# Patient Record
Sex: FEMALE | Race: WHITE | NOT HISPANIC OR LATINO | URBAN - METROPOLITAN AREA
[De-identification: names, ages, dates, MRNs, and addresses within clinical notes are randomized per-mention and may not be internally consistent; named-entity substitution may affect disease eponyms.]

---

## 2020-05-19 ENCOUNTER — TELEHEALTH PROVIDED OTHER THAN IN PATIENT'S HOME (OUTPATIENT)
Dept: URBAN - METROPOLITAN AREA TELEHEALTH 3 | Facility: TELEHEALTH | Age: 35
End: 2020-05-19

## 2020-05-19 VITALS — HEIGHT: 67 IN | WEIGHT: 180 LBS

## 2020-05-19 DIAGNOSIS — R19.7 DIARRHEA, UNSPECIFIED: ICD-10-CM

## 2020-05-19 DIAGNOSIS — R11.2 NAUSEA WITH VOMITING, UNSPECIFIED: ICD-10-CM

## 2020-05-19 DIAGNOSIS — R51 HEADACHE: ICD-10-CM

## 2020-05-19 PROCEDURE — 99244 OFF/OP CNSLTJ NEW/EST MOD 40: CPT | Mod: 95 | Performed by: PHYSICIAN ASSISTANT

## 2020-05-19 RX ORDER — ONDANSETRON 4 MG/1
TABLET, ORALLY DISINTEGRATING ORAL
Qty: 30 | Refills: 0 | Status: COMPLETED
Start: 2020-05-19 | End: 2024-06-05

## 2020-05-19 NOTE — SERVICEHPINOTES
PATIENT VERIFIED BY DATE OF BIRTH AND NAME. Patient has been consented for this telecommunication visit.     DELIA PASCUAL   is a   34   year old    female who is being seen in consultation at the request of   SIN CLAUDIO   for digestive symptoms over the past few weeks. She notes that she has been getting headaches in the afternoons and then when she tries to eat her evening meal, she vomits (within 5-10 minutes). All the food comes back up. She will still feel hungry and has been able to keep some bland foods down. No abdominal pain. She feels a sense of fogginess and weakness after vomiting. Bowel habits are somewhat irregular - chronically. She has occasional diarrhea - may have had more diarrhea the first weak of her symptoms. No blood in stools. No NSAID use. No fevers. Has lost a little weight due to the vomiting issues. Uses Tums for heartburn occasionally. She reports a h/o GI issues circa 2013 and also notes intermittent issues over the years. She reports h/o peptic ulcer (had an EGD) in high school. For 4-5 years after high school, she would have diarrhea and rectal bleeding. She reports 3 colonoscopies during that time (also drank alcohol heavily during that time) with benign findings. She had colon polyps removed (was told none were precancerous and that 10-year f/u would be fine). She reports a h/o bulimia but this hasn't been an issue in the past 6 years.  She denies possibility of pregnancy but is ok with me including that in her lab order today.

## 2020-05-19 NOTE — INTERFACERESULTNOTES
Labs show a very mild liver enzyme elevation but are otherwise normal - no pre-diabetes, normal inflammatory markers, acceptable iron levels, normal TSH thyroid test. Would schedule a RUQ U/S given elevated liver enzymes. F/U visit after EGD and U/S are both done.

## 2020-05-22 LAB
C-REACTIVE PROTEIN: 1.4 MG/L (ref ?–8)
CBC (INCLUDES DIFF/PLT): ABSOLUTE BAND NEUTROPHILS: NORMAL CELLS/UL
CBC (INCLUDES DIFF/PLT): ABSOLUTE BASOPHILS: 30 CELLS/UL (ref 0–200)
CBC (INCLUDES DIFF/PLT): ABSOLUTE BLASTS: NORMAL CELLS/UL
CBC (INCLUDES DIFF/PLT): ABSOLUTE EOSINOPHILS: 81 CELLS/UL (ref 15–500)
CBC (INCLUDES DIFF/PLT): ABSOLUTE LYMPHOCYTES: 1946 CELLS/UL (ref 850–3900)
CBC (INCLUDES DIFF/PLT): ABSOLUTE METAMYELOCYTES: NORMAL CELLS/UL
CBC (INCLUDES DIFF/PLT): ABSOLUTE MONOCYTES: 488 CELLS/UL (ref 200–950)
CBC (INCLUDES DIFF/PLT): ABSOLUTE MYELOCYTES: NORMAL CELLS/UL
CBC (INCLUDES DIFF/PLT): ABSOLUTE NEUTROPHILS: 4854 CELLS/UL (ref 1500–7800)
CBC (INCLUDES DIFF/PLT): ABSOLUTE NUCLEATED RBC: NORMAL CELLS/UL
CBC (INCLUDES DIFF/PLT): ABSOLUTE PROMYELOCYTES: NORMAL CELLS/UL
CBC (INCLUDES DIFF/PLT): BAND NEUTROPHILS: NORMAL %
CBC (INCLUDES DIFF/PLT): BASOPHILS: 0.4 %
CBC (INCLUDES DIFF/PLT): BLASTS: NORMAL %
CBC (INCLUDES DIFF/PLT): COMMENT(S): NORMAL
CBC (INCLUDES DIFF/PLT): EOSINOPHILS: 1.1 %
CBC (INCLUDES DIFF/PLT): HEMATOCRIT: 40.8 % (ref 35–45)
CBC (INCLUDES DIFF/PLT): HEMOGLOBIN: 14.2 G/DL (ref 11.7–15.5)
CBC (INCLUDES DIFF/PLT): LYMPHOCYTES: 26.3 %
CBC (INCLUDES DIFF/PLT): MCH: 31.6 PG (ref 27–33)
CBC (INCLUDES DIFF/PLT): MCHC: 34.8 G/DL (ref 32–36)
CBC (INCLUDES DIFF/PLT): MCV: 90.7 FL (ref 80–100)
CBC (INCLUDES DIFF/PLT): METAMYELOCYTES: NORMAL %
CBC (INCLUDES DIFF/PLT): MONOCYTES: 6.6 %
CBC (INCLUDES DIFF/PLT): MPV: 10.4 FL (ref 7.5–12.5)
CBC (INCLUDES DIFF/PLT): MYELOCYTES: NORMAL %
CBC (INCLUDES DIFF/PLT): NEUTROPHILS: 65.6 %
CBC (INCLUDES DIFF/PLT): NUCLEATED RBC: NORMAL /100 WBC
CBC (INCLUDES DIFF/PLT): PLATELET COUNT: 292 THOUSAND/UL (ref 140–400)
CBC (INCLUDES DIFF/PLT): PROMYELOCYTES: NORMAL %
CBC (INCLUDES DIFF/PLT): RDW: 11.8 % (ref 11–15)
CBC (INCLUDES DIFF/PLT): REACTIVE LYMPHOCYTES: NORMAL %
CBC (INCLUDES DIFF/PLT): RED BLOOD CELL COUNT: 4.5 MILLION/UL (ref 3.8–5.1)
CBC (INCLUDES DIFF/PLT): WHITE BLOOD CELL COUNT: 7.4 THOUSAND/UL (ref 3.8–10.8)
CELIAC DISEASE COMPREHENSIVE PANEL: IMMUNOGLOBULIN A: 159 MG/DL (ref 47–310)
CELIAC DISEASE COMPREHENSIVE PANEL: INTERPRETATION: (no result)
CELIAC DISEASE COMPREHENSIVE PANEL: TISSUE TRANSGLUTAMINASE AB, IGA: 1 U/ML
COMPREHENSIVE METABOLIC PANEL: ALBUMIN/GLOBULIN RATIO: 2.1 (CALC) (ref 1–2.5)
COMPREHENSIVE METABOLIC PANEL: ALBUMIN: 4.8 G/DL (ref 3.6–5.1)
COMPREHENSIVE METABOLIC PANEL: ALKALINE PHOSPHATASE: 89 U/L (ref 31–125)
COMPREHENSIVE METABOLIC PANEL: ALT: 32 U/L — HIGH (ref 6–29)
COMPREHENSIVE METABOLIC PANEL: AST: 26 U/L (ref 10–30)
COMPREHENSIVE METABOLIC PANEL: BILIRUBIN, TOTAL: 0.5 MG/DL (ref 0.2–1.2)
COMPREHENSIVE METABOLIC PANEL: BUN/CREATININE RATIO: (no result) (CALC)
COMPREHENSIVE METABOLIC PANEL: CALCIUM: 9.5 MG/DL (ref 8.6–10.2)
COMPREHENSIVE METABOLIC PANEL: CARBON DIOXIDE: 25 MMOL/L (ref 20–32)
COMPREHENSIVE METABOLIC PANEL: CHLORIDE: 100 MMOL/L (ref 98–110)
COMPREHENSIVE METABOLIC PANEL: CREATININE: 0.61 MG/DL (ref 0.5–1.1)
COMPREHENSIVE METABOLIC PANEL: EGFR AFRICAN AMERICAN: 137 ML/MIN/1.73M2 (ref 60–?)
COMPREHENSIVE METABOLIC PANEL: EGFR NON-AFR. AMERICAN: 118 ML/MIN/1.73M2 (ref 60–?)
COMPREHENSIVE METABOLIC PANEL: GLOBULIN: 2.3 G/DL (CALC) (ref 1.9–3.7)
COMPREHENSIVE METABOLIC PANEL: GLUCOSE: 90 MG/DL (ref 65–99)
COMPREHENSIVE METABOLIC PANEL: POTASSIUM: 4 MMOL/L (ref 3.5–5.3)
COMPREHENSIVE METABOLIC PANEL: PROTEIN, TOTAL: 7.1 G/DL (ref 6.1–8.1)
COMPREHENSIVE METABOLIC PANEL: SODIUM: 136 MMOL/L (ref 135–146)
COMPREHENSIVE METABOLIC PANEL: UREA NITROGEN (BUN): 13 MG/DL (ref 7–25)
FERRITIN: 74 NG/ML (ref 16–154)
HCG, TOTAL, QL: NEGATIVE
HEMOGLOBIN A1C: 5.1 % OF TOTAL HGB (ref ?–5.7)
INSULIN: 6.3 UIU/ML
IRON AND TOTAL IRON BINDING CAPACITY: % SATURATION: 28 % (CALC) (ref 16–45)
IRON AND TOTAL IRON BINDING CAPACITY: IRON BINDING CAPACITY: 374 MCG/DL (CALC) (ref 250–450)
IRON AND TOTAL IRON BINDING CAPACITY: IRON, TOTAL: 106 MCG/DL (ref 40–190)
SED RATE BY MODIFIED WESTERGREN: 2 MM/H (ref ?–20)
TSH: 1.53 MIU/L

## 2020-06-03 ENCOUNTER — OFFICE (OUTPATIENT)
Dept: URBAN - METROPOLITAN AREA PATHOLOGY 18 | Facility: PATHOLOGY | Age: 35
End: 2020-06-03
Payer: COMMERCIAL

## 2020-06-03 ENCOUNTER — OFFICE (OUTPATIENT)
Dept: URBAN - METROPOLITAN AREA CLINIC 30 | Facility: CLINIC | Age: 35
End: 2020-06-03

## 2020-06-03 VITALS
OXYGEN SATURATION: 98 % | DIASTOLIC BLOOD PRESSURE: 79 MMHG | RESPIRATION RATE: 14 BRPM | RESPIRATION RATE: 16 BRPM | DIASTOLIC BLOOD PRESSURE: 80 MMHG | WEIGHT: 180 LBS | HEIGHT: 67 IN | DIASTOLIC BLOOD PRESSURE: 76 MMHG | SYSTOLIC BLOOD PRESSURE: 132 MMHG | HEART RATE: 70 BPM | SYSTOLIC BLOOD PRESSURE: 144 MMHG | RESPIRATION RATE: 18 BRPM | HEART RATE: 78 BPM | TEMPERATURE: 98.6 F | OXYGEN SATURATION: 100 % | DIASTOLIC BLOOD PRESSURE: 82 MMHG | SYSTOLIC BLOOD PRESSURE: 139 MMHG | TEMPERATURE: 98.4 F | SYSTOLIC BLOOD PRESSURE: 115 MMHG | SYSTOLIC BLOOD PRESSURE: 130 MMHG | HEART RATE: 81 BPM | OXYGEN SATURATION: 96 % | HEART RATE: 68 BPM

## 2020-06-03 DIAGNOSIS — R19.7 DIARRHEA, UNSPECIFIED: ICD-10-CM

## 2020-06-03 DIAGNOSIS — R11.2 NAUSEA WITH VOMITING, UNSPECIFIED: ICD-10-CM

## 2020-06-03 PROCEDURE — 88305 TISSUE EXAM BY PATHOLOGIST: CPT | Performed by: PATHOLOGY

## 2020-06-03 PROCEDURE — 88313 SPECIAL STAINS GROUP 2: CPT | Performed by: PATHOLOGY

## 2020-06-03 PROCEDURE — 88342 IMHCHEM/IMCYTCHM 1ST ANTB: CPT | Performed by: PATHOLOGY

## 2020-06-10 ENCOUNTER — TELEHEALTH PROVIDED OTHER THAN IN PATIENT'S HOME (OUTPATIENT)
Dept: URBAN - METROPOLITAN AREA TELEHEALTH 7 | Facility: TELEHEALTH | Age: 35
End: 2020-06-10

## 2020-06-10 VITALS — HEIGHT: 67 IN | WEIGHT: 171 LBS

## 2020-06-10 DIAGNOSIS — R19.7 DIARRHEA, UNSPECIFIED: ICD-10-CM

## 2020-06-10 DIAGNOSIS — K59.09 OTHER CONSTIPATION: ICD-10-CM

## 2020-06-10 DIAGNOSIS — R11.2 NAUSEA WITH VOMITING, UNSPECIFIED: ICD-10-CM

## 2020-06-10 PROCEDURE — 99214 OFFICE O/P EST MOD 30 MIN: CPT | Mod: 95 | Performed by: PHYSICIAN ASSISTANT

## 2020-06-10 NOTE — SERVICENOTES
I have reviewed the history, physical exam, assessment and management plans.  I concur with or have edited all elements of her note.

Patient's visit was conducted through [employer] telecommunication. Patient consented before the start of visit as to understanding of privacy concerns, possible technological failure, and their responsibility of carrying out instructions of plan.

## 2020-06-10 NOTE — SERVICEHPINOTES
PATIENT VERIFIED BY DATE OF BIRTH AND NAME. Patient has been consented for this telecommunication visit. 33 yo female presents for f/u N/V. Her EGD was normal, including all biopsies. Her labs were also pretty unremarkable, including TSH, celiac panel, and inflammatory markers. She did have a mild ALT elevation (32) with a f/u abdominal U/S showing fatty liver but no gallstones or other abnormalities. She continues to have frequent vomiting and reports some further weight loss. Feels that she is able to keep fruit down but tends to vomit other foods. Currently has some constipation whereas before she had tendency for diarrhea. She has not found Zofran, acid-reducers, or probiotics to be very helpful. Can still get headaches - typically after vomiting.ROS today is positive for constipation, heartburn, weight loss, dizziness, anxiety, and loss of sleep.  Prior hx from 5/19 visit:  She notes that she has been getting headaches in the afternoons and then when she tries to eat her evening meal, she vomits (within 5-10 minutes). All the food comes back up. She will still feel hungry and has been able to keep some bland foods down. No abdominal pain. She feels a sense of fogginess and weakness after vomiting. Bowel habits are somewhat irregular - chronically. She has occasional diarrhea - may have had more diarrhea the first weak of her symptoms. No blood in stools. No NSAID use. No fevers. Has lost a little weight due to the vomiting issues. Uses Tums for heartburn occasionally. She reports a h/o GI issues circa 2013 and also notes intermittent issues over the years. She reports h/o peptic ulcer (had an EGD) in high school. For 4-5 years after high school, she would have diarrhea and rectal bleeding. She reports 3 colonoscopies during that time (also drank alcohol heavily during that time) with benign findings. She had colon polyps removed (was told none were precancerous and that 10-year f/u would be fine). She reports a h/o bulimia but this hasn't been an issue in the past 6 years.5/20/20 AST/ALT 26/32, HgbA1C 5.1, TSH 1.53, ESR 2, CBC wnl, iron sat 28, ferritin 74, CRP 1.4, insulin 6.3, glucose 90, HCG neg, celiac panel neg

## 2024-06-05 ENCOUNTER — TELEHEALTH PROVIDED OTHER THAN IN PATIENT'S HOME (OUTPATIENT)
Dept: URBAN - METROPOLITAN AREA TELEHEALTH 12 | Facility: TELEHEALTH | Age: 39
End: 2024-06-05

## 2024-06-05 VITALS — HEIGHT: 67 IN | WEIGHT: 168 LBS

## 2024-06-05 DIAGNOSIS — K73.9 CHRONIC HEPATITIS, UNSPECIFIED: ICD-10-CM

## 2024-06-05 DIAGNOSIS — R93.2 ABNORMAL FINDINGS ON DIAGNOSTIC IMAGING OF LIVER AND BILIARY: ICD-10-CM

## 2024-06-05 PROCEDURE — 99204 OFFICE O/P NEW MOD 45 MIN: CPT | Mod: 95 | Performed by: INTERNAL MEDICINE

## 2024-06-05 NOTE — SERVICEHPINOTES
PATIENT VERIFIED BY DATE OF BIRTH AND NAME. Patient has been consented for this telecommunication visit using [employer] application.
myron sanches 
37 yo female w/ PMhx of depression, anxiety here for abnormal U/S imaging which revealed mild HM.  No cirrhosis seen, no liver lesions.  She admits to have gained weight over the years, also has HLD.   Approx 2 months ago, she noticed dull achy sensation in RUQ region.   The pain then returned, so she went to PCP and underwent U/S imaging.  The pain was worsened upon inspiration.  No injuries, no N/V, no jaundice, no fever.  No bowel changes, no rectal bleeding.  
myron Ramirez LFTs are within normal limits.  She feels well now.  No major GI complaints.  No new medications.myron sanchesPrevious GI w/u: she had an EGD in 2020 for persistent abd pain, which revealed gastritis. myron sanches

## 2024-06-05 NOTE — SERVICENOTES
Patient was located in their home during visit., Patient's visit was conducted through [employer] telecommunication. Patient consented before the start of visit as to understanding of privacy concerns, possible technological failure, and their responsibility of carrying out instructions of plan.